# Patient Record
Sex: FEMALE | Race: WHITE
[De-identification: names, ages, dates, MRNs, and addresses within clinical notes are randomized per-mention and may not be internally consistent; named-entity substitution may affect disease eponyms.]

---

## 2019-08-25 ENCOUNTER — HOSPITAL ENCOUNTER (INPATIENT)
Dept: HOSPITAL 35 - ER | Age: 79
LOS: 2 days | Discharge: HOME | DRG: 552 | End: 2019-08-27
Attending: HOSPITALIST | Admitting: HOSPITALIST
Payer: COMMERCIAL

## 2019-08-25 VITALS — SYSTOLIC BLOOD PRESSURE: 149 MMHG | DIASTOLIC BLOOD PRESSURE: 91 MMHG

## 2019-08-25 VITALS — SYSTOLIC BLOOD PRESSURE: 118 MMHG | DIASTOLIC BLOOD PRESSURE: 62 MMHG

## 2019-08-25 VITALS — DIASTOLIC BLOOD PRESSURE: 99 MMHG | SYSTOLIC BLOOD PRESSURE: 174 MMHG

## 2019-08-25 VITALS — HEIGHT: 67 IN | WEIGHT: 174.8 LBS | BODY MASS INDEX: 27.44 KG/M2

## 2019-08-25 VITALS — DIASTOLIC BLOOD PRESSURE: 67 MMHG | SYSTOLIC BLOOD PRESSURE: 122 MMHG

## 2019-08-25 DIAGNOSIS — I50.9: ICD-10-CM

## 2019-08-25 DIAGNOSIS — I11.0: ICD-10-CM

## 2019-08-25 DIAGNOSIS — M51.26: Primary | ICD-10-CM

## 2019-08-25 DIAGNOSIS — Z90.710: ICD-10-CM

## 2019-08-25 DIAGNOSIS — E78.5: ICD-10-CM

## 2019-08-25 DIAGNOSIS — M54.30: ICD-10-CM

## 2019-08-25 DIAGNOSIS — I48.91: ICD-10-CM

## 2019-08-25 DIAGNOSIS — E87.6: ICD-10-CM

## 2019-08-25 DIAGNOSIS — Z79.82: ICD-10-CM

## 2019-08-25 DIAGNOSIS — Z88.0: ICD-10-CM

## 2019-08-25 DIAGNOSIS — M81.0: ICD-10-CM

## 2019-08-25 DIAGNOSIS — Z98.49: ICD-10-CM

## 2019-08-25 DIAGNOSIS — Z79.899: ICD-10-CM

## 2019-08-25 DIAGNOSIS — Z87.891: ICD-10-CM

## 2019-08-25 DIAGNOSIS — E83.42: ICD-10-CM

## 2019-08-25 DIAGNOSIS — R53.81: ICD-10-CM

## 2019-08-25 LAB
ALBUMIN SERPL-MCNC: 3.6 G/DL (ref 3.4–5)
ALBUMIN SERPL-MCNC: 3.7 G/DL (ref 3.4–5)
ALT SERPL-CCNC: 32 U/L (ref 30–65)
ANION GAP SERPL CALC-SCNC: 10 MMOL/L (ref 7–16)
APTT BLD: 24.7 SECONDS (ref 24.5–32.8)
AST SERPL-CCNC: 33 U/L (ref 15–37)
BASOPHILS NFR BLD AUTO: 0.9 % (ref 0–2)
BILIRUB SERPL-MCNC: 0.5 MG/DL
BUN SERPL-MCNC: 19 MG/DL (ref 7–18)
CALCIUM SERPL-MCNC: 9.7 MG/DL (ref 8.5–10.1)
CHLORIDE SERPL-SCNC: 97 MMOL/L (ref 98–107)
CHOLEST SERPL-MCNC: 183 MG/DL (ref ?–200)
CO2 SERPL-SCNC: 29 MMOL/L (ref 21–32)
CREAT SERPL-MCNC: 0.9 MG/DL (ref 0.6–1)
EOSINOPHIL NFR BLD: 1.4 % (ref 0–3)
ERYTHROCYTE [DISTWIDTH] IN BLOOD BY AUTOMATED COUNT: 13.6 % (ref 10.5–14.5)
GLUCOSE SERPL-MCNC: 118 MG/DL (ref 74–106)
GRANULOCYTES NFR BLD MANUAL: 69.6 % (ref 36–66)
HCT VFR BLD CALC: 42.7 % (ref 37–47)
HDLC SERPL-MCNC: 65 MG/DL (ref 40–?)
HGB BLD-MCNC: 14.7 GM/DL (ref 12–15)
INR PPP: 1
LDLC SERPL-MCNC: 105 MG/DL (ref ?–100)
LYMPHOCYTES NFR BLD AUTO: 19.6 % (ref 24–44)
MAGNESIUM SERPL-MCNC: 2 MG/DL (ref 1.8–2.4)
MCH RBC QN AUTO: 31 PG (ref 26–34)
MCHC RBC AUTO-ENTMCNC: 34.4 G/DL (ref 28–37)
MCV RBC: 90 FL (ref 80–100)
MONOCYTES NFR BLD: 8.5 % (ref 1–8)
NEUTROPHILS # BLD: 5 THOU/UL (ref 1.4–8.2)
PLATELET # BLD: 190 THOU/UL (ref 150–400)
POTASSIUM SERPL-SCNC: 3.5 MMOL/L (ref 3.5–5.1)
PROT SERPL-MCNC: 7.7 G/DL (ref 6.4–8.2)
PROT SERPL-MCNC: 7.7 G/DL (ref 6.4–8.2)
PROTHROMBIN TIME: 10.7 SECONDS (ref 9.3–11.4)
RBC # BLD AUTO: 4.74 MIL/UL (ref 4.2–5)
SODIUM SERPL-SCNC: 136 MMOL/L (ref 136–145)
TC:HDL: 2.8 RATIO
TRIGL SERPL-MCNC: 68 MG/DL (ref ?–150)
TROPONIN I SERPL-MCNC: <0.06 NG/ML (ref ?–0.06)
VLDLC SERPL CALC-MCNC: 14 MG/DL (ref ?–40)
WBC # BLD AUTO: 7.1 THOU/UL (ref 4–11)

## 2019-08-25 PROCEDURE — 10081 I&D PILONIDAL CYST COMP: CPT

## 2019-08-25 NOTE — NUR
80 YO FEMALE ADMITTED  FROM ED. VSS, ALERT AND ORIENTED X 4, CARDIZEM
GTT AT 10, C/O PAIN IN LOWER BACK WITH MOVEMENT,  AT BEDSIDE, WILL
CONTINUE TO MONITOR

## 2019-08-26 VITALS — SYSTOLIC BLOOD PRESSURE: 112 MMHG | DIASTOLIC BLOOD PRESSURE: 58 MMHG

## 2019-08-26 VITALS — SYSTOLIC BLOOD PRESSURE: 114 MMHG | DIASTOLIC BLOOD PRESSURE: 54 MMHG

## 2019-08-26 VITALS — DIASTOLIC BLOOD PRESSURE: 58 MMHG | SYSTOLIC BLOOD PRESSURE: 112 MMHG

## 2019-08-26 VITALS — DIASTOLIC BLOOD PRESSURE: 57 MMHG | SYSTOLIC BLOOD PRESSURE: 115 MMHG

## 2019-08-26 VITALS — SYSTOLIC BLOOD PRESSURE: 120 MMHG | DIASTOLIC BLOOD PRESSURE: 67 MMHG

## 2019-08-26 VITALS — DIASTOLIC BLOOD PRESSURE: 76 MMHG | SYSTOLIC BLOOD PRESSURE: 132 MMHG

## 2019-08-26 VITALS — DIASTOLIC BLOOD PRESSURE: 50 MMHG | SYSTOLIC BLOOD PRESSURE: 103 MMHG

## 2019-08-26 LAB
ANION GAP SERPL CALC-SCNC: 9 MMOL/L (ref 7–16)
BUN SERPL-MCNC: 21 MG/DL (ref 7–18)
CALCIUM SERPL-MCNC: 8.6 MG/DL (ref 8.5–10.1)
CHLORIDE SERPL-SCNC: 98 MMOL/L (ref 98–107)
CO2 SERPL-SCNC: 28 MMOL/L (ref 21–32)
CREAT SERPL-MCNC: 0.9 MG/DL (ref 0.6–1)
ERYTHROCYTE [DISTWIDTH] IN BLOOD BY AUTOMATED COUNT: 13.6 % (ref 10.5–14.5)
GLUCOSE SERPL-MCNC: 141 MG/DL (ref 74–106)
HCT VFR BLD CALC: 40.8 % (ref 37–47)
HGB BLD-MCNC: 14 GM/DL (ref 12–15)
MAGNESIUM SERPL-MCNC: 1.7 MG/DL (ref 1.8–2.4)
MCH RBC QN AUTO: 30.8 PG (ref 26–34)
MCHC RBC AUTO-ENTMCNC: 34.4 G/DL (ref 28–37)
MCV RBC: 89.6 FL (ref 80–100)
PLATELET # BLD: 174 THOU/UL (ref 150–400)
POTASSIUM SERPL-SCNC: 3 MMOL/L (ref 3.5–5.1)
RBC # BLD AUTO: 4.55 MIL/UL (ref 4.2–5)
SODIUM SERPL-SCNC: 135 MMOL/L (ref 136–145)
WBC # BLD AUTO: 6 THOU/UL (ref 4–11)

## 2019-08-26 NOTE — NUR
VSS REMAINS NSR WITH FREQUENT PVC'S AND PAC'S. NOW OFF CARDIZEM GTT AND ON PO
MEDS. LUNGS CLEAR, RA SAT IS 96%. UP IN ROOM WITHOUT C/O CHESTPAIN AND
SOB.WILL CONTINUE TO MONITER AND CARE FOR PT PER [facility]

## 2019-08-26 NOTE — NUR
ASSESSMENT AS DOCUMENTED.PT BEEN RESTING IN NO ACUTE DISTRESS.ADMITTED WITH
LOWER BACK PAIN AND AFIB W/RVR.PT DENIES PAIN TO LOWER BACK.REMAINS ON
CARDIZEM DRIP AT 5MG/HR.VSS.DENIES CHEST PAIN.ON MONITOR SA WITH PVCS.REPLACED
MAGNESIUM AND POTASSIUM.STANDBY ASSIST TO BR.POC IS TO HAVE ECHO THIS AM.

## 2019-08-26 NOTE — EKG
[place] [address] [address] [facility] [employer]
[place]  [address]
Phone:  [phone]                    ELECTROCARDIOGRAM REPORT      
_______________________________________________________________________________
 
Name:       SORAIDA PAK                Room #:         209-P       ADM IN  
M.R.#:      [id number]     Account #:      [id number]  
Admission:  19    Attend Phys:    Josemanuel Frazier MD 
Discharge:              Date of Birth:  [birth year]  
                                                          Report #: [id number]
   [id number]
_______________________________________________________________________________
THIS REPORT FOR:   //name//                          
 
                         [facility] ED
                                       
Test Date:    2019               Test Time:    12:47:55
Pat Name:     SORAIDA PAK           Department:   
Patient ID:   [id number]            Room:         209
Gender:       F                        Technician:   BRANDO
:          1940               Requested By: Paulino Casas
Order Number: [id number] MD:   Dharmesh Pride
                                 Measurements
Intervals                              Axis          
Rate:         146                      P:            
WV:                                    QRS:          9
QRSD:         92                       T:            81
QT:           289                                    
QTc:          451                                    
                           Interpretive Statements
Atrial fibrillation with rapid V-rate
Occasional premature ventricular complexes
Poor R wave progression
Nonspecific ST and T wave abnormality 
No previous ECG available for comparison
 
Electronically Signed On 2019 7:52:53 CDT by Dharmesh Pride
[email or web address]
 
 
 
 
 
 
 
 
 
 
 
 
 
 
 
 
 
  <ELECTRONICALLY SIGNED>
   By: Dharmesh Pride MD, [facility]   
  19     0752
D: 19 1247                           _____________________________________
T: 19 124                           Dharmesh Pride MD, [facility]     /EPI

## 2019-08-26 NOTE — 2DMMODE
[facility]
[address]
[place]  [address]
Phone:  [phone] 2 D/M-MODE ECHOCARDIOGRAM     
_______________________________________________________________________________
 
Name:            SORAIDA PAK                Room #:        209-P       [facility] [place]
M.R.#:           [id number]          Account #:     [id number]  
Admission:       08/25/19         Attend Phys:   Josemanuel Frazier,
Discharge:                  Date of Birth: [birth year]  
Date of Service: 08/26/19 0948               Report #:      [id number]
        [id number]
_______________________________________________________________________________
THIS REPORT FOR:   //name//                          
 
 
--------------- APPROVED REPORT --------------
 
 
Study performed:  08/26/2019 08:39:19
 
EXAM: Comprehensive 2D, Doppler, and color-flow 
Echocardiogram 
Patient Location: Bedside   
Room #:  209     Status:  routine
 
      BSA:         1.91
HR: 68 bpm BP:          112/58 mmHg 
 
Other Information 
Study Quality: Adequate
 
Indications
Hypertension/HDD
Afib with RVR, HLD
 
2D Dimensions
RVDd:  34.27 mm  
IVSd:  11.72 (7-11mm) LVOT Diam:  19.51 (18-24mm) 
LVDd:  42.33 mm  
PWd:  10.72 (7-11mm) Ascending Ao:  30.89 (22-36mm)
LVDs:  26.05 (25-40mm) 
Aortic Root:  29.22 mm IVC:  13.00 mm
 
Volumes
Left Atrial Volume (Systole) 
Single Plane 4CH:  48.88 mL Single Plane 2CH:  42.12 mL
    LA ESV Index:  29.00 mL/m2
 
Aortic Valve
AoV Peak Luciano.:  1.49 m/s 
AO Peak Gr.:  8.93 mmHg  LVOT Max PG:  3.68 mmHg
    LVOT Max V:  0.96 m/s
ASHLEY Vmax: 1.92 cm2  
 
Mitral Valve
    E/A Ratio:  1.1
    MV Decel. Time:  176.70 ms
MV E Max Luciano.:  0.82 m/s 
 
 
[facility]
[address]
[place]  [address]
Phone:  [phone]                    2 D/M-MODE ECHOCARDIOGRAM     
_______________________________________________________________________________
 
Name:            SORAIDA PAK                Room #:        209-P       [facility] [place]
M.R.#:           [id number]          Account #:     [id number]  
Admission:       08/25/19         Attend Phys:   Josemanuel Frazier,
Discharge:                  Date of Birth: [birth year]  
Date of Service: 08/26/19 0948               Report #:      [id number]
        [id number]
_______________________________________________________________________________
MV A Luciano.:  0.78 m/s  
MV PHT:  51.24 ms  
IVRT:  110.73 ms  
 
Pulmonary Valve
PV Peak Luciano.:  0.91 m/s PV Peak Gr.:  3.32 mmHg
 
Pulmonary Vein
P Vein S:    0.70 m/s P Vein A:  0.15 m/s
P Vein D:   0.40 m/s P Vein A Dur.:  87.7 msec
P Vein S/D Ratio:  1.75 
 
Tricuspid Valve
TR Peak Luciano.:  2.64 m/s  RAP Estimate:  5.00 mmHg
TR Peak Gr.:  27.83 mmHg 
    PA Pressure:  33.00 mmHg
 
Left Ventricle
The left ventricle is normal size. There is normal LV segmental wall 
motion. Mild concentric left ventricular hypertrophy. The left 
ventricular systolic function is normal. The left ventricular 
ejection fraction is within the normal range. LVEF is 60-65%. The 
left ventricular diastolic function is normal.
 
Right Ventricle
The right ventricle is normal size. The right ventricular systolic 
function is normal.
 
Atria
The left atrium size is normal. The right atrium size is 
normal.
 
Aortic Valve
The aortic valve is normal in structure. No aortic regurgitation is 
present. There is no aortic valvular stenosis.
 
Mitral Valve
The mitral valve is normal in structure. Mild to moderate mitral 
regurgitation. No evidence of mitral valve stenosis.
 
Tricuspid Valve
The tricuspid valve is normal in structure. Mild tricuspid 
regurgitation. PAP is estimated at 33 mmHg.
 
Pulmonic Valve
The pulmonary valve is normal in structure. There is no pulmonic 
 
 
[place] [address]
Phone:  [phone]                    2 D/M-MODE ECHOCARDIOGRAM     
_______________________________________________________________________________
 
Name:            SORAIDA PAK                Room #:        209-P       [facility] [place]
[facility]#:           [id number]          Account #:     [id number]  
Admission:       08/25/19         Attend Phys:   Josemanuel Frazier,
Discharge:                  Date of Birth: [birth year]  
Date of Service: 08/26/19 0948               Report #:      [id number]
        [id number]
_______________________________________________________________________________
valvular regurgitation.
 
Great Vessels
The aortic root is normal in size. IVC is normal in size and 
collapses >50% with inspiration.
 
Pericardium
There is no pericardial effusion.
 
<Conclusion>
The left ventricular systolic function is normal.
There is normal LV segmental wall motion.
LVEF is 60-65%.
The aortic valve is normal in structure. No aortic regurgitation or 
stenosis
The mitral valve is normal in structure. Mild to moderate mitral 
regurgitation.
Mild tricuspid regurgitation. Pulmonary artery  pressure estimated at 
33 mmHg.
There is no pericardial effusion.
 
 
 
 
 
 
 
 
 
 
 
 
 
 
 
 
 
 
 
 
 
 
 
 
  <ELECTRONICALLY SIGNED>
   By: Dharmesh Pride MD, [facility]   
  08/26/19 0948
D: 08/26/19 0948                           _____________________________________
T: 08/26/19 0948                           Dharmesh Pride MD, FACC     /INF

## 2019-08-27 VITALS — DIASTOLIC BLOOD PRESSURE: 74 MMHG | SYSTOLIC BLOOD PRESSURE: 143 MMHG

## 2019-08-27 VITALS — DIASTOLIC BLOOD PRESSURE: 58 MMHG | SYSTOLIC BLOOD PRESSURE: 112 MMHG

## 2019-08-27 VITALS — DIASTOLIC BLOOD PRESSURE: 72 MMHG | SYSTOLIC BLOOD PRESSURE: 129 MMHG

## 2019-08-27 VITALS — DIASTOLIC BLOOD PRESSURE: 67 MMHG | SYSTOLIC BLOOD PRESSURE: 137 MMHG

## 2019-08-27 LAB
ANION GAP SERPL CALC-SCNC: 8 MMOL/L (ref 7–16)
BUN SERPL-MCNC: 24 MG/DL (ref 7–18)
CALCIUM SERPL-MCNC: 8.9 MG/DL (ref 8.5–10.1)
CHLORIDE SERPL-SCNC: 97 MMOL/L (ref 98–107)
CO2 SERPL-SCNC: 29 MMOL/L (ref 21–32)
CREAT SERPL-MCNC: 0.7 MG/DL (ref 0.6–1)
ERYTHROCYTE [DISTWIDTH] IN BLOOD BY AUTOMATED COUNT: 13.5 % (ref 10.5–14.5)
GLUCOSE SERPL-MCNC: 96 MG/DL (ref 74–106)
HCT VFR BLD CALC: 43 % (ref 37–47)
HGB BLD-MCNC: 14.7 GM/DL (ref 12–15)
MAGNESIUM SERPL-MCNC: 2.1 MG/DL (ref 1.8–2.4)
MCH RBC QN AUTO: 31 PG (ref 26–34)
MCHC RBC AUTO-ENTMCNC: 34.2 G/DL (ref 28–37)
MCV RBC: 90.7 FL (ref 80–100)
PLATELET # BLD: 178 THOU/UL (ref 150–400)
POTASSIUM SERPL-SCNC: 2.9 MMOL/L (ref 3.5–5.1)
RBC # BLD AUTO: 4.74 MIL/UL (ref 4.2–5)
SODIUM SERPL-SCNC: 134 MMOL/L (ref 136–145)
WBC # BLD AUTO: 10.4 THOU/UL (ref 4–11)

## 2019-08-27 NOTE — NUR
ASSUMED CARE OF PT AT SHIFT CHANGE. ASSESSMENTS AS CHARTED. MEDS GIVEN PER
MAR. PT ALERT AND ORIENTED, VSS, DENIES PAIN, NO C/O SOB OR CHEST PAIN. O2
SATS WNL ON ROOM AIR. DC ORDERS ACKNOWLEDGED AND IMPLEMENTED, DC PAPERWORK
DISCUSSED WITH PT. COMMUNICATES UNDERSTANDING. PT LEFT UNIT AT 1410 WITH ALL
BELONGINGS ACCOMPANIED BY . IV REMOVED. TELE REMOVED.

## 2019-08-27 NOTE — NUR
ASSESSMENT AS DOCUMENTED.PT BEEN RESTING IN NO ACUTE DISTRESS.VSS.SR/SA ON
MONITOR.PT DENIES CHEST PAIN OR PAIN.ZIO CARDIAC MONITOR IN PLACE.POC IS TO
DISCHARGE TO HOME TODAY.WILL CONT TO MONITOR PER POC.

## 2020-10-01 ENCOUNTER — HOSPITAL ENCOUNTER (OUTPATIENT)
Dept: HOSPITAL 35 - SJCVC | Age: 80
End: 2020-10-01
Attending: INTERNAL MEDICINE
Payer: COMMERCIAL

## 2020-10-01 DIAGNOSIS — R94.31: Primary | ICD-10-CM

## 2020-10-01 DIAGNOSIS — I11.0: ICD-10-CM

## 2020-10-01 DIAGNOSIS — I48.91: ICD-10-CM

## 2020-10-01 DIAGNOSIS — Z87.891: ICD-10-CM

## 2020-10-01 DIAGNOSIS — E78.5: ICD-10-CM

## 2020-10-01 DIAGNOSIS — Z79.899: ICD-10-CM

## 2020-10-01 DIAGNOSIS — I50.9: ICD-10-CM

## 2021-04-01 ENCOUNTER — HOSPITAL ENCOUNTER (OUTPATIENT)
Dept: HOSPITAL 35 - SJCVC | Age: 81
End: 2021-04-01
Attending: INTERNAL MEDICINE
Payer: COMMERCIAL

## 2021-04-01 DIAGNOSIS — I50.9: ICD-10-CM

## 2021-04-01 DIAGNOSIS — R42: ICD-10-CM

## 2021-04-01 DIAGNOSIS — R55: ICD-10-CM

## 2021-04-01 DIAGNOSIS — I12.9: ICD-10-CM

## 2021-04-01 DIAGNOSIS — E78.00: ICD-10-CM

## 2021-04-01 DIAGNOSIS — E78.5: ICD-10-CM

## 2021-04-01 DIAGNOSIS — Z79.899: ICD-10-CM

## 2021-04-01 DIAGNOSIS — Z87.891: ICD-10-CM

## 2021-04-01 DIAGNOSIS — I48.0: Primary | ICD-10-CM

## 2021-04-01 DIAGNOSIS — Z88.0: ICD-10-CM

## 2021-10-07 ENCOUNTER — HOSPITAL ENCOUNTER (OUTPATIENT)
Dept: HOSPITAL 35 - SJCVC | Age: 81
End: 2021-10-07
Attending: INTERNAL MEDICINE
Payer: COMMERCIAL

## 2021-10-07 DIAGNOSIS — R55: ICD-10-CM

## 2021-10-07 DIAGNOSIS — Z79.899: ICD-10-CM

## 2021-10-07 DIAGNOSIS — I11.0: ICD-10-CM

## 2021-10-07 DIAGNOSIS — I10: ICD-10-CM

## 2021-10-07 DIAGNOSIS — I48.91: Primary | ICD-10-CM

## 2021-10-07 DIAGNOSIS — I50.9: ICD-10-CM

## 2021-10-07 DIAGNOSIS — E78.5: ICD-10-CM

## 2021-10-07 DIAGNOSIS — Z88.0: ICD-10-CM

## 2021-10-07 DIAGNOSIS — Z87.891: ICD-10-CM
